# Patient Record
Sex: FEMALE | Race: BLACK OR AFRICAN AMERICAN | Employment: PART TIME | URBAN - METROPOLITAN AREA
[De-identification: names, ages, dates, MRNs, and addresses within clinical notes are randomized per-mention and may not be internally consistent; named-entity substitution may affect disease eponyms.]

---
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
## 2019-09-25 ENCOUNTER — OFFICE VISIT (OUTPATIENT)
Dept: OBGYN CLINIC | Age: 19
End: 2019-09-25

## 2019-09-25 VITALS
WEIGHT: 149 LBS | HEIGHT: 62 IN | DIASTOLIC BLOOD PRESSURE: 76 MMHG | SYSTOLIC BLOOD PRESSURE: 140 MMHG | BODY MASS INDEX: 27.42 KG/M2

## 2019-09-25 DIAGNOSIS — Z71.1 WORRIED WELL: Primary | ICD-10-CM

## 2020-08-31 ENCOUNTER — HOSPITAL ENCOUNTER (OUTPATIENT)
Dept: ULTRASOUND IMAGING | Age: 20
Discharge: HOME | End: 2020-08-31
Attending: OTOLARYNGOLOGY
Payer: COMMERCIAL

## 2020-08-31 DIAGNOSIS — Z00.8 OTHER SPECIFIED GENERAL MEDICAL EXAMINATION: ICD-10-CM

## 2020-08-31 DIAGNOSIS — Z78.9 NON-SMOKER: ICD-10-CM

## 2020-08-31 DIAGNOSIS — R22.0 LOCALIZED SWELLING, MASS, AND LUMP OF HEAD: ICD-10-CM

## 2020-08-31 PROCEDURE — 88173 CYTOPATH EVAL FNA REPORT: CPT

## 2020-08-31 PROCEDURE — 74011000250 HC RX REV CODE- 250: Performed by: RADIOLOGY

## 2020-08-31 PROCEDURE — 77030014115

## 2020-08-31 PROCEDURE — 10005 FNA BX W/US GDN 1ST LES: CPT

## 2020-08-31 PROCEDURE — 88185 FLOWCYTOMETRY/TC ADD-ON: CPT

## 2020-08-31 PROCEDURE — 88172 CYTP DX EVAL FNA 1ST EA SITE: CPT

## 2020-08-31 PROCEDURE — 88184 FLOWCYTOMETRY/ TC 1 MARKER: CPT

## 2020-08-31 RX ORDER — LIDOCAINE HYDROCHLORIDE 10 MG/ML
10 INJECTION, SOLUTION EPIDURAL; INFILTRATION; INTRACAUDAL; PERINEURAL
Status: COMPLETED | OUTPATIENT
Start: 2020-08-31 | End: 2020-08-31

## 2020-08-31 RX ADMIN — LIDOCAINE HYDROCHLORIDE 10 ML: 10 INJECTION, SOLUTION EPIDURAL; INFILTRATION; INTRACAUDAL; PERINEURAL at 14:50

## 2020-09-23 NOTE — PROGRESS NOTES
EMERGENCY DEPARTMENT ENCOUNTER    Room Number:  20/20  Date of encounter:  9/22/2020  PCP: Provider, No Known    HPI:  Context: Philip Santos is a 22 y.o. male who presents to the ED c/o chief complaint of left shoulder dislocation.  Patient reports he was stretching his chest and shoulders by swinging his arms backwards when his left shoulder spontaneously dislocated.  Patient denies any falls or trauma.  Patient reports he has been unable to lift his shoulder since that time, complains of dull pain, worse with movement.  Patient denies any weakness or numbness, no skin lacerations.  Patient reports he has dislocated his shoulder multiple times in the past, last was approximately 4 months ago.  Patient reports prior to injury, he was at baseline without complaint.  Patient has no history of heart or lung disease, non-smoker, last p.o. intake at approximately noon, has had anesthesia in the past without difficulty.    MEDICAL HISTORY REVIEW  Reviewed in EPIC    PAST MEDICAL HISTORY  Active Ambulatory Problems     Diagnosis Date Noted   • No Active Ambulatory Problems     Resolved Ambulatory Problems     Diagnosis Date Noted   • No Resolved Ambulatory Problems     No Additional Past Medical History       PAST SURGICAL HISTORY  No past surgical history on file.    FAMILY HISTORY  No family history on file.    SOCIAL HISTORY  Social History     Socioeconomic History   • Marital status: Single     Spouse name: Not on file   • Number of children: Not on file   • Years of education: Not on file   • Highest education level: Not on file       ALLERGIES  Patient has no known allergies.    The patient's allergies have been reviewed    REVIEW OF SYSTEMS  All systems reviewed and negative except for those discussed in HPI.     PHYSICAL EXAM  I have reviewed the triage vital signs and nursing notes.  ED Triage Vitals   Temp Heart Rate Resp BP SpO2   09/22/20 2148 09/22/20 2148 09/22/20 2148 09/22/20 2154 09/22/20 2148   98.2 °F  Vulvar lesion evaluation    Alex Desir is a 19 y.o. female  No LMP recorded. who presents with bumps on her vagina. She noticed it 1 week ago. The lesion has not shown the following change: increasing number of lesions. No new lesions have developed. She reports the following associated symptoms: none. She denies the following associated symptoms: itching, pain, redness, drainage, swelling, irritation. Aggravating factors: none. Alleviating factors: none. No past medical history on file. No past surgical history on file. Social History     Occupational History    Not on file   Tobacco Use    Smoking status: Never Smoker    Smokeless tobacco: Never Used   Substance and Sexual Activity    Alcohol use: No    Drug use: No    Sexual activity: Never     Family History   Problem Relation Age of Onset    No Known Problems Mother        No Known Allergies  Prior to Admission medications    Medication Sig Start Date End Date Taking? Authorizing Provider   TRINESSA, 28, 0.18/0.215/0.25 mg-35 mcg (28) tab TAKE 1 TABLET BY MOUTH DAILY. PATIENT TAKES CONTINUOUSLY 2/11/18   Raffaele Montague MD   norgestimate-ethinyl estradiol (TRINESSA, 28,) 0.18/0.215/0.25 mg-35 mcg (28) tab Take 1 Tab by mouth daily. Patient takes continuously.  11/22/17   Raffaele Montague MD        Review of Systems: History obtained from the patient  Constitutional: negative for weight loss, fever, night sweats  GI: negative for change in bowel habits, abdominal pain, black or bloody stools  : negative for frequency, dysuria, hematuria, vaginal discharge  MSK: negative for back pain, joint pain, muscle pain  Skin: negative for itching, rash, hives elsewhere  Neuro: negative for dizziness, headache, confusion, weakness  Psych: negative for anxiety, depression, change in mood  Heme/lymph: negative for bleeding, bruising, pallor    Objective:  Visit Vitals  /76   Ht 5' 2\" (1.575 m)   Wt 149 lb (67.6 kg)   BMI 27.25 kg/m² (36.8 °C) 97 18 132/84 97 %      Temp src Heart Rate Source Patient Position BP Location FiO2 (%)   09/22/20 2148 09/22/20 2148 -- -- --   Tympanic Monitor        GENERAL: No acute distress  HENT: NCAT, PERRL, Nares patent  EYES: no scleral icterus  NECK: trachea midline, no ROM limitations  CV: regular rhythm, regular rate  RESPIRATORY: normal effort  ABDOMEN: soft  : deferred  MUSCULOSKELETAL: Left shoulder dislocation with probable anterior dislocation, skin intact, no crepitus or significant swelling, axillary nerve sensation intact light touch, full strength with elbow flexion and extension. Positive thumbs up/okay sign/fingers abduct/fingers abduct, sensation intact light touch radial/medial/ulnar nerve distribution, 2+ radial and ulnar pulses, brisk cap refill all digits.  NEURO: alert, moves all extremities, follows commands  SKIN: warm, dry    LAB RESULTS  No results found for this or any previous visit (from the past 24 hour(s)).    I ordered the above labs and reviewed the results.    RADIOLOGY  Xr Shoulder 1 View Left    Result Date: 9/22/2020  SINGLE VIEW LEFT SHOULDER  HISTORY: Left shoulder dislocation  COMPARISON: 09/22/2020  FINDINGS: Since prior exam, the patient has undergone reduction of the left shoulder dislocation. Left humeral head appears to articulate normally with the glenoid on this single view.      Left humeral head appears to articulate normally with the glenoid.  This report was finalized on 9/22/2020 11:01 PM by Dr. Shaneka Tapia M.D.      Xr Shoulder 2+ View Left    Result Date: 9/22/2020  3 VIEWS LEFT SHOULDER  HISTORY: Left shoulder popped out. Patient has a history of multiple dislocations.  COMPARISON: None available.  FINDINGS: Patient's left humeral head appears displaced anteriorly and inferiorly relative to the glenoid. No definite acute fracture is seen.      Left shoulder dislocation  This report was finalized on 9/22/2020 10:49 PM by Dr. Shaneka Tapia M.D.   Physical Exam:   PHYSICAL EXAMINATION    Constitutional  · Appearance: well-nourished, well developed, alert, in no acute distress    Genitourinary  · External Genitalia: normal appearance for age, no discharge present, no tenderness present, no inflammatory lesions present, no masses present, no atrophy present  · Vagina: normal vaginal vault without central or paravaginal defects, no discharge present, no inflammatory lesions present, no masses present  · Bladder: non-tender to palpation  · Urethra: appears normal  · Cervix: normal   · Uterus: normal size, shape and consistency  · Adnexa: no adnexal tenderness present, no adnexal masses present  · Perineum: perineum within normal limits, no evidence of trauma, no rashes or skin lesions present  · Anus: anus within normal limits, no hemorrhoids present  · Inguinal Lymph Nodes: no lymphadenopathy present    Skin  · General Inspection: no rash, no lesions identified    Neurologic/Psychiatric  · Mental Status:  · Orientation: grossly oriented to person, place and time  · Mood and Affect: mood normal, affect appropriate    Assessment:   Worried well- normal vulvar architecture. RTO prn  Instructions given to pt. Handouts given to pt.       I ordered the above noted radiological studies. I reviewed the images and results. I agree with the radiologist interpretation.    PROCEDURES  Procedural Sedation    Date/Time: 9/22/2020 10:39 PM  Performed by: Dipak Guevara MD  Authorized by: Dipak Guevara MD     Consent:     Consent obtained:  Verbal and written    Consent given by:  Patient    Risks discussed:  Allergic reaction, prolonged hypoxia resulting in organ damage, prolonged sedation necessitating reversal, dysrhythmia, inadequate sedation, nausea, respiratory compromise necessitating ventilatory assistance and intubation and vomiting    Alternatives discussed:  Analgesia without sedation, anxiolysis and regional anesthesia  Indications:     Procedure performed:  Dislocation reduction    Procedure necessitating sedation performed by:  Different physician    Intended level of sedation:  Moderate (conscious sedation)  Pre-sedation assessment:     ASA classification: class 1 - normal, healthy patient      Pre-sedation assessments completed and reviewed: airway patency, cardiovascular function, hydration status, mental status, nausea/vomiting, pain level and respiratory function    Immediate pre-procedure details:     Reassessment: Patient reassessed immediately prior to procedure      Reviewed: vital signs, relevant labs/tests and NPO status      Verified: bag valve mask available, emergency equipment available, intubation equipment available, IV patency confirmed, oxygen available and suction available    Procedure details (see MAR for exact dosages):     Sedation start time:  9/22/2020 10:29 PM    Preoxygenation:  Nasal cannula    Sedation:  Propofol    Intra-procedure monitoring:  Blood pressure monitoring, continuous capnometry, continuous pulse oximetry, cardiac monitor, frequent LOC assessments and frequent vital sign checks    Intra-procedure events: none    Post-procedure details:     Post-sedation assessment completed:  9/22/2020  10:40 PM    Attendance: Constant attendance by certified staff until patient recovered      Recovery: Patient returned to pre-procedure baseline      Post-sedation assessments completed and reviewed: airway patency, cardiovascular function, hydration status, mental status, nausea/vomiting, pain level and respiratory function      Patient is stable for discharge or admission: yes      Patient tolerance:  Tolerated well, no immediate complications        MEDICATIONS GIVEN IN ER  Medications   sodium chloride 0.9 % bolus 500 mL (500 mL Intravenous New Bag 9/22/20 2221)   Propofol (DIPRIVAN) injection 88.5 mg (has no administration in time range)   Propofol (DIPRIVAN) injection (40 mg Intravenous Given 9/22/20 2237)       PROGRESS, DATA ANALYSIS, CONSULTS, AND MEDICAL DECISION MAKING  A complete history and physical exam have been performed.  All available laboratory and imaging results have been reviewed by myself prior to disposition.  Patient was placed in face mask in first look. Patient was wearing facemask when I entered the room and throughout our encounter. I wore full protective equipment throughout this patient encounter including a face mask, and gloves. Hand hygiene was performed before donning protective equipment and after removal when leaving the room.  MDM  After the initial H&P, I discussed pertinent information from history and physical exam with patient/family.  Discussed differential diagnosis.  Discussed plan for ED evaluation/work-up/treatment.  All questions answered.  Patient/family is agreeable with plan.  ED Course as of Sep 22 2309   Tue Sep 22, 2020   2225 I reviewed left shoulder x-ray in PACS, my findings are: Anterior dislocation of left shoulder, no fracture.    [JG]   2240 Post reduction imaging ordered.     [EW]   2257 I reviewed repeat left shoulder x-ray in PACS, my findings are: Interval reduction of left shoulder dislocation with no identified fracture.    [JG]   2300 The patient  was reexamined.  They have had symptomatic improvement during their ED stay.  I discussed today's findings with the patient, explaining the pertinent positives and negatives from today's visit, and the plan of care.  Discussed plan for discharge as there is no emergent indication for admission.  Discussed limitation of the ED work-up and that this is to rule out life-threatening emergencies but that they could require further testing as determined by their primary care and or any referred specialist patient is agreeable and understands need for follow-up and repeat exam/testing.  Patient is aware that discharge does not mean there is nothing wrong, indicates no emergency is present, and that they must continue their care with their primary care physician and/or any referred specialist.  They were given appropriate follow-up with their primary care physician and/or specialist.  I had an extensive discussion on the expected clinical course and return precautions.  Patient understands to return to the emergency department for continuation, worsening, or new symptoms.  I answered any of the patient's questions. Patient was discharged home in a stable condition.        [JG]      ED Course User Index  [EW] Elif Schaefer APRN  [JG] Dipak Guevara MD       AS OF 23:09 EDT VITALS:    BP - 119/61  HR - 75  TEMP - 98.2 °F (36.8 °C) (Tympanic)  O2 SATS - 98%    DIAGNOSIS  Final diagnoses:   Dislocation of left shoulder joint, initial encounter         DISPOSITION  DISCHARGE    Patient discharged in stable condition.    Reviewed implications of results, diagnosis, meds, responsibility to follow up, warning signs and symptoms of possible worsening, potential complications and reasons to return to ER.    Patient/Family voiced understanding of above instructions.    Discussed plan for discharge, as there is no emergent indication for admission. Patient referred to primary care provider for BP management due to today's BP.  Pt/family is agreeable and understands need for follow up and repeat testing.  Pt is aware that discharge does not mean that nothing is wrong but it indicates no emergency is present that requires admission and they must continue care with follow-up as given below or physician of their choice.     FOLLOW-UP  Your PCP    Schedule an appointment as soon as possible for a visit in 2 days  even if well    [facility] [place] [address]  [phone]    if you are unable to follow up with your PCP    Junior Guerra MD  [address] [facility] [address]  [place] [address]  [phone]    Schedule an appointment as soon as possible for a visit in 2 days  for further management of your shoulder dislocation         Medication List      New Prescriptions    cyclobenzaprine 5 MG tablet  Commonly known as: FLEXERIL  Take 1 tablet by mouth 3 (Three) Times a Day As Needed for Muscle Spasms.     ibuprofen 600 MG tablet  Commonly known as: ADVIL,MOTRIN  Take 1 tablet by mouth Every 6 (Six) Hours As Needed for Mild Pain .           Where to Get Your Medications      You can get these medications from any pharmacy    Bring a paper prescription for each of these medications  · cyclobenzaprine 5 MG tablet  · ibuprofen 600 MG tablet          Dipak Guevara MD  09/22/20 [id number]

## 2023-05-17 RX ORDER — NORGESTIMATE AND ETHINYL ESTRADIOL 7DAYSX3 28
1 KIT ORAL DAILY
COMMUNITY
Start: 2017-11-22

## 2023-05-17 RX ORDER — NORGESTIMATE AND ETHINYL ESTRADIOL 7DAYSX3 28
KIT ORAL
COMMUNITY
Start: 2018-02-11